# Patient Record
Sex: FEMALE | Race: WHITE | URBAN - METROPOLITAN AREA
[De-identification: names, ages, dates, MRNs, and addresses within clinical notes are randomized per-mention and may not be internally consistent; named-entity substitution may affect disease eponyms.]

---

## 2020-12-16 ENCOUNTER — OFFICE VISIT (OUTPATIENT)
Dept: URBAN - METROPOLITAN AREA CLINIC 82 | Facility: CLINIC | Age: 45
End: 2020-12-16
Payer: COMMERCIAL

## 2020-12-16 ENCOUNTER — WEB ENCOUNTER (OUTPATIENT)
Dept: URBAN - METROPOLITAN AREA CLINIC 82 | Facility: CLINIC | Age: 45
End: 2020-12-16

## 2020-12-16 DIAGNOSIS — R74.8 ABNORMAL LIVER ENZYMES: ICD-10-CM

## 2020-12-16 PROCEDURE — 99244 OFF/OP CNSLTJ NEW/EST MOD 40: CPT | Performed by: INTERNAL MEDICINE

## 2020-12-16 PROCEDURE — G8420 CALC BMI NORM PARAMETERS: HCPCS | Performed by: INTERNAL MEDICINE

## 2020-12-16 PROCEDURE — G9905 NO PT TBCO SCRN RNG: HCPCS | Performed by: INTERNAL MEDICINE

## 2020-12-16 PROCEDURE — G8427 DOCREV CUR MEDS BY ELIG CLIN: HCPCS | Performed by: INTERNAL MEDICINE

## 2020-12-16 NOTE — HPI-TODAY'S VISIT:
12/16/2020  Patient is a 45 year old, White female who presents for a GI evaluation of abnormal liver enzymes. Patient's alkaline phosphatase was low. Patient states this is the first time she was found to have abnormal liver enzymes. She denies any use of natural or herbal supplements. She was on ProBio 5 supplements, but has recently stopped taking them. Denies any jaundice or hepatitis. She states her brother had cirrhosis of the liver, but it was due to alcohol and substance abuse. Her mother and one other family member have had diverticulitis. She does report alcohol use at least 3 times a week.

## 2020-12-23 LAB
AFP, SERUM, TUMOR MARKER: 1.3
ANA DIRECT: NEGATIVE
ANTI-SMOOTH MUSCLE AB BY IFA: (no result)
CERULOPLASMIN: 39.8
HBSAG SCREEN: NEGATIVE
HEP A AB, IGM: NEGATIVE
HEP B CORE AB, IGM: NEGATIVE
HEP C VIRUS AB: <0.1
IRON BIND.CAP.(TIBC): 328
IRON SATURATION: 29
IRON: 95
MITOCHONDRIAL (M2) ANTIBODY: <20
T-TRANSGLUTAMINASE (TTG) IGA: <2
T-TRANSGLUTAMINASE (TTG) IGG: <2
UIBC: 233

## 2021-01-08 ENCOUNTER — OFFICE VISIT (OUTPATIENT)
Dept: URBAN - METROPOLITAN AREA CLINIC 81 | Facility: CLINIC | Age: 46
End: 2021-01-08
Payer: COMMERCIAL

## 2021-01-08 DIAGNOSIS — R74.8 ABNORMAL LIVER ENZYMES: ICD-10-CM

## 2021-01-08 PROCEDURE — 76705 ECHO EXAM OF ABDOMEN: CPT | Performed by: INTERNAL MEDICINE

## 2021-01-11 ENCOUNTER — TELEPHONE ENCOUNTER (OUTPATIENT)
Dept: URBAN - METROPOLITAN AREA CLINIC 82 | Facility: CLINIC | Age: 46
End: 2021-01-11

## 2021-01-13 ENCOUNTER — TELEPHONE ENCOUNTER (OUTPATIENT)
Dept: URBAN - METROPOLITAN AREA CLINIC 82 | Facility: CLINIC | Age: 46
End: 2021-01-13

## 2021-01-19 ENCOUNTER — TELEPHONE ENCOUNTER (OUTPATIENT)
Dept: URBAN - METROPOLITAN AREA CLINIC 82 | Facility: CLINIC | Age: 46
End: 2021-01-19

## 2021-02-04 ENCOUNTER — TELEPHONE ENCOUNTER (OUTPATIENT)
Dept: URBAN - METROPOLITAN AREA CLINIC 82 | Facility: CLINIC | Age: 46
End: 2021-02-04

## 2021-06-01 ENCOUNTER — TELEPHONE ENCOUNTER (OUTPATIENT)
Dept: URBAN - METROPOLITAN AREA CLINIC 78 | Facility: CLINIC | Age: 46
End: 2021-06-01

## 2021-06-14 ENCOUNTER — TELEPHONE ENCOUNTER (OUTPATIENT)
Dept: URBAN - METROPOLITAN AREA CLINIC 82 | Facility: CLINIC | Age: 46
End: 2021-06-14

## 2021-06-23 ENCOUNTER — OFFICE VISIT (OUTPATIENT)
Dept: URBAN - METROPOLITAN AREA CLINIC 82 | Facility: CLINIC | Age: 46
End: 2021-06-23

## 2021-06-25 ENCOUNTER — OFFICE VISIT (OUTPATIENT)
Dept: URBAN - METROPOLITAN AREA CLINIC 82 | Facility: CLINIC | Age: 46
End: 2021-06-25
Payer: COMMERCIAL

## 2021-06-25 DIAGNOSIS — R74.8 ABNORMAL LIVER ENZYMES: ICD-10-CM

## 2021-06-25 DIAGNOSIS — E78.5 HYPERLIPIDEMIA: ICD-10-CM

## 2021-06-25 PROCEDURE — G8420 CALC BMI NORM PARAMETERS: HCPCS | Performed by: INTERNAL MEDICINE

## 2021-06-25 PROCEDURE — G9905 NO PT TBCO SCRN RNG: HCPCS | Performed by: INTERNAL MEDICINE

## 2021-06-25 PROCEDURE — G8427 DOCREV CUR MEDS BY ELIG CLIN: HCPCS | Performed by: INTERNAL MEDICINE

## 2021-06-25 PROCEDURE — 99214 OFFICE O/P EST MOD 30 MIN: CPT | Performed by: INTERNAL MEDICINE

## 2021-06-25 NOTE — HPI-TODAY'S VISIT:
12/16/2020  Patient is a 45 year old, White female who presents for a GI evaluation of abnormal liver enzymes. Patient's alkaline phosphatase was low. Patient states this is the first time she was found to have abnormal liver enzymes. She denies any use of natural or herbal supplements. She was on ProBio 5 supplements, but has recently stopped taking them. Denies any jaundice or hepatitis. She states her brother had cirrhosis of the liver, but it was due to alcohol and substance abuse. Her mother and one other family member have had diverticulitis. She does report alcohol use at least 3 times a week.  06/25/2021 Patient presents for a follow up office visit for evaluation of abnormal LFTs. Labs on 05/19/2021 with Dr. Tatum showed alkaline phosphatase is low (44), cholesterol is high. RUQ US on 01/08/2021 was normal. Liver enzymes have been stbale for the past 6 months. She denies any pertinent stomach problems. Denies any depression or anxiety. Patient says she has had high cholesterol for a long time even on a strict diet. No prior colonoscopy.

## 2021-12-09 ENCOUNTER — TELEPHONE ENCOUNTER (OUTPATIENT)
Dept: URBAN - METROPOLITAN AREA CLINIC 82 | Facility: CLINIC | Age: 46
End: 2021-12-09

## 2021-12-11 ENCOUNTER — LAB OUTSIDE AN ENCOUNTER (OUTPATIENT)
Dept: URBAN - METROPOLITAN AREA CLINIC 82 | Facility: CLINIC | Age: 46
End: 2021-12-11

## 2021-12-17 ENCOUNTER — OFFICE VISIT (OUTPATIENT)
Dept: URBAN - METROPOLITAN AREA CLINIC 82 | Facility: CLINIC | Age: 46
End: 2021-12-17
Payer: COMMERCIAL

## 2021-12-17 ENCOUNTER — DASHBOARD ENCOUNTERS (OUTPATIENT)
Age: 46
End: 2021-12-17

## 2021-12-17 VITALS
RESPIRATION RATE: 14 BRPM | SYSTOLIC BLOOD PRESSURE: 100 MMHG | HEIGHT: 66 IN | BODY MASS INDEX: 24.75 KG/M2 | DIASTOLIC BLOOD PRESSURE: 67 MMHG | TEMPERATURE: 98.4 F | HEART RATE: 76 BPM | WEIGHT: 154 LBS

## 2021-12-17 DIAGNOSIS — R74.8 ABNORMAL LIVER ENZYMES: ICD-10-CM

## 2021-12-17 DIAGNOSIS — E78.5 HYPERLIPIDEMIA: ICD-10-CM

## 2021-12-17 PROBLEM — 55822004 HYPERLIPIDEMIA: Status: ACTIVE | Noted: 2021-12-17

## 2021-12-17 PROBLEM — 166643006 LIVER ENZYMES ABNORMAL: Status: ACTIVE | Noted: 2021-12-17

## 2021-12-17 PROCEDURE — 99214 OFFICE O/P EST MOD 30 MIN: CPT | Performed by: INTERNAL MEDICINE

## 2021-12-17 PROCEDURE — G9905 NO PT TBCO SCRN RNG: HCPCS | Performed by: INTERNAL MEDICINE

## 2021-12-17 PROCEDURE — G8427 DOCREV CUR MEDS BY ELIG CLIN: HCPCS | Performed by: INTERNAL MEDICINE

## 2021-12-17 PROCEDURE — G8420 CALC BMI NORM PARAMETERS: HCPCS | Performed by: INTERNAL MEDICINE

## 2021-12-17 NOTE — HPI-TODAY'S VISIT:
12/16/2020  Patient is a 45 year old, White female who presents for a GI evaluation of abnormal liver enzymes. Patient's alkaline phosphatase was low. Patient states this is the first time she was found to have abnormal liver enzymes. She denies any use of natural or herbal supplements. She was on ProBio 5 supplements, but has recently stopped taking them. Denies any jaundice or hepatitis. She states her brother had cirrhosis of the liver, but it was due to alcohol and substance abuse. Her mother and one other family member have had diverticulitis. She does report alcohol use at least 3 times a week.  06/25/2021 Patient presents for a follow up office visit for evaluation of abnormal LFTs. Labs on 05/19/2021 with Dr. Tatum showed alkaline phosphatase is low (44), cholesterol is high. RUQ US on 01/08/2021 was normal. Liver enzymes have been stbale for the past 6 months. She denies any pertinent stomach problems. Denies any depression or anxiety. Patient says she has had high cholesterol for a long time even on a strict diet. No prior colonoscopy.  12/17/2021 Patient presents for follow up. Labs on 12/9/2021 from Dr. Flores showed normal liver enzymes, normal vitamin D. Patient denies any active GI symptoms. She does have occasional bloating. Work up for rhematoid arthritis was negative. 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 Flores is planning to start her on new arthritis medication.

## 2022-06-15 NOTE — PHYSICAL EXAM GASTROINTESTINAL
Abdomen , soft, nontender, nondistended , no guarding or rigidity , no masses palpable , normal bowel sounds , Liver and Spleen , no hepatomegaly present , no hepatosplenomegaly , liver nontender , spleen not palpable done